# Patient Record
Sex: FEMALE | URBAN - METROPOLITAN AREA
[De-identification: names, ages, dates, MRNs, and addresses within clinical notes are randomized per-mention and may not be internally consistent; named-entity substitution may affect disease eponyms.]

---

## 2019-06-27 ENCOUNTER — IMPORTED ENCOUNTER (OUTPATIENT)
Dept: URBAN - METROPOLITAN AREA CLINIC 1 | Facility: CLINIC | Age: 29
End: 2019-06-27

## 2019-06-27 PROBLEM — H52.03: Noted: 2019-06-27

## 2019-06-27 PROCEDURE — S0620 ROUTINE OPHTHALMOLOGICAL EXA: HCPCS

## 2019-06-27 NOTE — PATIENT DISCUSSION
1.  Hyperopia OU -- Finalized Glasses MRx was given to patient today for corrective spectacles if indicated. Return for an appointment in 1 YR for a 36 OU with Dr. Evon Zhong.

## 2022-04-02 ASSESSMENT — VISUAL ACUITY
OD_CC: 20/20
OS_SC: J1
OD_SC: J1
OS_CC: 20/25

## 2022-04-02 ASSESSMENT — TONOMETRY
OD_IOP_MMHG: 15
OS_IOP_MMHG: 15

## 2022-04-02 ASSESSMENT — KERATOMETRY
OD_K1POWER_DIOPTERS: 41.75
OS_AXISANGLE_DEGREES: 169
OD_AXISANGLE_DEGREES: 003
OS_K2POWER_DIOPTERS: 43.50
OS_AXISANGLE2_DEGREES: 079
OS_K1POWER_DIOPTERS: 41.75
OD_AXISANGLE2_DEGREES: 093
OD_K2POWER_DIOPTERS: 43.25

## 2024-02-12 ENCOUNTER — NEW PATIENT (OUTPATIENT)
Dept: URBAN - METROPOLITAN AREA CLINIC 1 | Facility: CLINIC | Age: 34
End: 2024-02-12

## 2024-02-12 DIAGNOSIS — Z01.00: ICD-10-CM

## 2024-02-12 DIAGNOSIS — H52.222: ICD-10-CM

## 2024-02-12 PROCEDURE — 92004 COMPRE OPH EXAM NEW PT 1/>: CPT

## 2024-02-12 PROCEDURE — 92015 DETERMINE REFRACTIVE STATE: CPT

## 2024-02-12 ASSESSMENT — KERATOMETRY
OS_K1POWER_DIOPTERS: 41.75
OS_K2POWER_DIOPTERS: 43.50
OD_K2POWER_DIOPTERS: 43.25
OD_K1POWER_DIOPTERS: 41.75
OD_AXISANGLE2_DEGREES: 093
OS_AXISANGLE2_DEGREES: 079
OS_AXISANGLE_DEGREES: 169
OD_AXISANGLE_DEGREES: 003

## 2024-02-12 ASSESSMENT — TONOMETRY
OS_IOP_MMHG: 14
OD_IOP_MMHG: 14

## 2024-02-12 ASSESSMENT — VISUAL ACUITY
OS_CC: 20/25
OD_CC: 20/30
OD_CC: J1+
OS_CC: J1+
OD_SC: J1+
OD_SC: 20/20
OS_SC: 20/25
OS_SC: J1+